# Patient Record
Sex: FEMALE | Employment: STUDENT | URBAN - METROPOLITAN AREA
[De-identification: names, ages, dates, MRNs, and addresses within clinical notes are randomized per-mention and may not be internally consistent; named-entity substitution may affect disease eponyms.]

---
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
## 2024-05-20 ENCOUNTER — E-ADVICE (OUTPATIENT)
Dept: FAMILY MEDICINE | Age: 19
End: 2024-05-20

## 2024-05-20 ENCOUNTER — V-VISIT (OUTPATIENT)
Dept: FAMILY MEDICINE | Age: 19
End: 2024-05-20

## 2024-05-20 DIAGNOSIS — Z88.9 HISTORY OF SEASONAL ALLERGIES: Primary | ICD-10-CM

## 2024-05-20 PROBLEM — J45.909 ASTHMA (CMD): Status: ACTIVE | Noted: 2024-05-20

## 2024-05-20 PROCEDURE — 99203 OFFICE O/P NEW LOW 30 MIN: CPT | Performed by: NURSE PRACTITIONER

## 2024-05-20 RX ORDER — CETIRIZINE HYDROCHLORIDE 10 MG/1
10 TABLET ORAL DAILY PRN
Status: SHIPPED | COMMUNITY
Start: 2024-05-20

## 2024-05-20 RX ORDER — FLUTICASONE PROPIONATE 50 MCG
1 SPRAY, SUSPENSION (ML) NASAL DAILY
Qty: 1 EACH | Refills: 0 | Status: SHIPPED | OUTPATIENT
Start: 2024-05-20 | End: 2024-05-27

## (undated) NOTE — ED AVS SNAPSHOT
Parent/Legal Guardian Access to the Online [facility] Record of a Patient 15to 16Years Old  Return completed form by Secure email to [facility] Records Department: [email or web address].     Requirements and Procedures   Under [facility] MyChart ID and password with another person, that person may be able to view my or my child’s health information, and health information about someone who has authorized me as a MyChart proxy.    ·  I agree that it is my responsibility to select a confident Sign-Up Form and I agree to its terms.        Authorization Form     Please enter Patient’s information below:   Name (last, first, middle initial) __________________________________________   Gender  Male  Female    Last 4 Digits of Social Security Number Parent/Legal Guardian Signature                                  For Patient (1517 years of age)  I agree to allow my parent/legal guardian, named above, online access to my medical information currently available and that may become available as a result